# Patient Record
Sex: FEMALE | URBAN - METROPOLITAN AREA
[De-identification: names, ages, dates, MRNs, and addresses within clinical notes are randomized per-mention and may not be internally consistent; named-entity substitution may affect disease eponyms.]

---

## 2018-10-16 ENCOUNTER — MEDICAL CORRESPONDENCE (OUTPATIENT)
Dept: HEALTH INFORMATION MANAGEMENT | Facility: CLINIC | Age: 48
End: 2018-10-16

## 2018-10-16 ENCOUNTER — TRANSFERRED RECORDS (OUTPATIENT)
Dept: HEALTH INFORMATION MANAGEMENT | Facility: CLINIC | Age: 48
End: 2018-10-16

## 2018-11-06 ENCOUNTER — TRANSFERRED RECORDS (OUTPATIENT)
Dept: HEALTH INFORMATION MANAGEMENT | Facility: CLINIC | Age: 48
End: 2018-11-06

## 2018-11-30 NOTE — TELEPHONE ENCOUNTER
FUTURE VISIT INFORMATION      FUTURE VISIT INFORMATION:    Date: 12/10/2018    Time: 2:10    [place] [facility] [place]:    Referring provider:  Dr. Simin Salcido     Referring providers clinic:  [facility]    Reason for visit/diagnosis  throat clearing and hoarseness     RECORDS REQUESTED FROM:       Clinic name Comments Records Status Imaging Status   [facility] OFFICE VISIT: 10/16/2018,11/06/2018 EXTERNAL N/A                                   PUT RECORDS IN HIM BAG TO GET SCANNED.

## 2018-12-10 ENCOUNTER — OFFICE VISIT (OUTPATIENT)
Dept: OTOLARYNGOLOGY | Facility: CLINIC | Age: 48
End: 2018-12-10
Payer: COMMERCIAL

## 2018-12-10 ENCOUNTER — PRE VISIT (OUTPATIENT)
Dept: OTOLARYNGOLOGY | Facility: CLINIC | Age: 48
End: 2018-12-10

## 2018-12-10 DIAGNOSIS — R09.89 CHRONIC THROAT CLEARING: ICD-10-CM

## 2018-12-10 DIAGNOSIS — R49.0 DYSPHONIA: Primary | ICD-10-CM

## 2018-12-10 DIAGNOSIS — J38.7 IRRITABLE LARYNX SYNDROME: ICD-10-CM

## 2018-12-10 DIAGNOSIS — R49.0 DYSPHONIA: ICD-10-CM

## 2018-12-10 RX ORDER — HYDROXYZINE HYDROCHLORIDE 25 MG/1
25 TABLET, FILM COATED ORAL
COMMUNITY
Start: 2018-09-04

## 2018-12-10 RX ORDER — LACTULOSE 10 G/10G
SOLUTION ORAL
COMMUNITY
Start: 2018-09-04

## 2018-12-10 NOTE — LETTER
"12/10/2018      RE: Betty Anglin  [address] [address]       Dear Dr. Salcido:    I had the pleasure of meeting Betty Anglin in consultation at the [facility] of the [facility] at your request, for evaluation of dysphonia and chronic throat-clearing. The note from our visit follows. Speech recognition software may have been used in the documentation below; input is reviewed before signature to the best of my ability. I appreciate the opportunity to participate in the care of this pleasant patient.    Please feel free to contact me with any questions.    Sincerely yours,    Garima Bryson M.D., M.P.H.  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 Symptoms include increased effort to talk/sing, throat irritation, mucus in throat, frequent throat-clearing, poor voice quality, voice tires easily, voice breaks, change in vocal pitch, change in range, shaky/unsteady voice and gravelly voice.      Voice  The patient reports an eight year history of voice problems.  She used to have laryngospasm, but no longer does.  She thinks this is perhaps because she is not pushing herself to the same intensity.  She feels her voice problem began gradually.  Her typical vocal demand is routine. Her singing vocal effort is significantly increased.  She feels that her voice is normal sometimes.  At times, her heavy voice use leads to a \"weak and broken\" voice.  She feels that her singing voice is \"awful\" now.      Swallowing  No concerns.       Cough/Throat-clearing  She states she has had throat-clearing since she had the laryngospasm a number of years ago.  It can be disruptive. She feels often that the " more she talks, the more it happens.       Breathing  No concerns.       Throat discomfort  No concerns. Mild tightness.      Reflux-type symptoms  She experiences heartburn/indigestion monthly. She is not taking reflux medications. She did have a trial of proton pump inhibitors for a period of time.  The throat-clearing was somewhat improved, but persisted.  She was seen at [facility] and was recommended to come to see us for these symptoms.       Prior outside records were reviewed for this visit. She does have a history of binge-eating disorder as well as anxiety. 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 She is also a prior high level runner.    MEDICATIONS:     Current Outpatient Medications   Medication Sig Dispense Refill     linaclotide (LINZESS) 145 MCG capsule Take 145 mcg by mouth       hydrOXYzine (ATARAX) 25 MG tablet Take 25 mg by mouth       lactulose (KRISTALOSE) 10 GM packet Take 1-2 packets 1-3 daily         ALLERGIES:  Allergies not on file    PAST MEDICAL HISTORY: No past medical history on file.  As per HPI    PAST SURGICAL HISTORY: No past surgical history on file.    HABITS/SOCIAL HISTORY:    Social History     Tobacco Use     Smoking status: Not on file   Substance Use Topics     Alcohol use: Not on file   No smoking or alcohol use      FAMILY HISTORY:  Father had lung cancer, then pancreatic cancer    REVIEW OF SYSTEMS:  The patient completed a comprehensive 11 point review of systems (below), which was reviewed. Positives are as noted below; pertinent findings are as noted in the HPI.     Patient Supplied Answers to Review of Systems  No flowsheet data found.    PHYSICAL EXAMINATION:  General: The patient was alert and conversant, and in no acute distress.    Eyes: PERRL, conjunctiva and lids normal, sclera nonicteric.  Nose: Anterior rhinoscopy: no gross abnormalities. no  bleeding; no  mucopurulence; septum grossly  normal, mild mucoid drainage and/or crusting.  Oral cavity/oropharynx: No masses or lesions. Dentition in good condition. Floor of mouth and oral tongue soft to palpation. Tongue mobility and palate elevation intact and symmetric.  Ears: Normal auricles, external auditory canals bilaterally. Visualized portions of tympanic membranes normal bilaterally.   Neck: No palpable cervical lymphadenopathy. There was moderate  tenderness to palpation of the thyrohyoid space, which was narrow. No obvious thyroid abnormality. Landmarks palpable.  Resp: Breathing comfortably, no stridor or stertor.  Neuro: Symmetric facial function. Other cranial nerves as documented above.  Psych: Normal affect, pleasant and cooperative.  Voice/speech: Mild dysphonia characterized by mild intermittent glottal garcia.  Extremities: No cyanosis, clubbing, or edema of the upper extremities.    Intake scores  Total Score for Last Patient-Answered VHI Questionnaire  VHI Total Score 12/10/2018   VHI Total Score 13     Total Score for Last Patient-Answered EAT Questionnaire  EAT Total Score 12/10/2018   EAT Total Score Incomplete     Total Score for Last Patient-Answered CSI Questionnaire  CSI Total Score 12/10/2018   CSI Total Score 0       PROCEDURE: Flexible fiberoptic laryngoscopy and laryngovideostroboscopy  Indications: This procedure was warranted to evaluate the patient's laryngeal anatomy and function. Risks, benefits, and alternatives were discussed.  Description: After written informed consent was obtained, a time-out was performed to confirm patient identity, procedure, and procedure site. Topical 3% lidocaine with 0.25% phenylephrine was applied to the nasal cavities. I performed the endoscopy and no complications were apparent. Continuous and stroboscopic light were utilized to assess routine phonation and variable frequency phonation.  Performed by: Garima Bryson MD MPH  SLP: Neli Kolb, PhD, CCC-SLP   Findings: Normal  nasopharynx. Normal base of tongue, valleculae, and epiglottis. Vocal fold mobility: right: normal; left: normal. Medial edges of the vocal folds: smooth and straight. No focal mucosal lesions were observed on the true vocal folds. Glissade produced appropriate elongation. There was moderate supraglottic recruitment with connected speech. Mucosa of false vocal folds, aryepiglottic folds, piriform sinuses, and posterior glottis unremarkable. Tiny area of prominence right posterior vocal process, no nancy granuloma. Airway was patent.   No lesions on NBI.    The addition of stroboscopy allowed evaluation of the mucosal wave.   Amplitude: right: normal; left: normal. Symmetry: intermittent symmetry. Closure pattern: complete. Closure plane: at glottic level. Phase distribution: normal.            IMPRESSION AND PLAN:   Betty Anglin presents with muscle tension dysphonia and irritable larynx symptoms. There is a tiny area of prominence on the right posterior vocal process, but no nancy granuloma.    I recommended speech therapy as initial primary management, with goals including reducing laryngeal irritability, improving laryngeal efficiency, decreasing vocal fold trauma and improving respiratory/phonatory coordination.     If she feels she has ongoing reflux, she will return to [facility] for further evaluation of reflux, but I recommended that she first pursue speech therapy.     She will return as needed. I appreciate the opportunity to participate in the care of this pleasant patient.             Garima Bryson MD

## 2018-12-10 NOTE — PROGRESS NOTES
"Dear Dr. Salcido:    I had the pleasure of meeting Betty Anglin in consultation at the [facility] of the [facility] at your request, for evaluation of dysphonia and chronic throat-clearing. The note from our visit follows. Speech recognition software may have been used in the documentation below; input is reviewed before signature to the best of my ability. I appreciate the opportunity to participate in the care of this pleasant patient.    Please feel free to contact me with any questions.    Sincerely yours,    Garima Bryson M.D., M.P.H.  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 Symptoms include increased effort to talk/sing, throat irritation, mucus in throat, frequent throat-clearing, poor voice quality, voice tires easily, voice breaks, change in vocal pitch, change in range, shaky/unsteady voice and gravelly voice.      Voice  The patient reports an eight year history of voice problems.  She used to have laryngospasm, but no longer does.  She thinks this is perhaps because she is not pushing herself to the same intensity.  She feels her voice problem began gradually.  Her typical vocal demand is routine. Her singing vocal effort is significantly increased.  She feels that her voice is normal sometimes.  At times, her heavy voice use leads to a \"weak and broken\" voice.  She feels that her singing voice is \"awful\" now.      Swallowing  No concerns.       Cough/Throat-clearing  She states she has had throat-clearing since she had the laryngospasm a number of years ago.  It can be disruptive. She feels often that the more she talks, the more it happens.       Breathing  No concerns.       Throat " discomfort  No concerns. Mild tightness.      Reflux-type symptoms  She experiences heartburn/indigestion monthly. She is not taking reflux medications. She did have a trial of proton pump inhibitors for a period of time.  The throat-clearing was somewhat improved, but persisted.  She was seen at [facility] and was recommended to come to see us for these symptoms.       Prior outside records were reviewed for this visit. She does have a history of binge-eating disorder as well as anxiety. 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 She is also a prior high level runner.    MEDICATIONS:     Current Outpatient Medications   Medication Sig Dispense Refill     linaclotide (LINZESS) 145 MCG capsule Take 145 mcg by mouth       hydrOXYzine (ATARAX) 25 MG tablet Take 25 mg by mouth       lactulose (KRISTALOSE) 10 GM packet Take 1-2 packets 1-3 daily         ALLERGIES:  Allergies not on file    PAST MEDICAL HISTORY: No past medical history on file.  As per HPI    PAST SURGICAL HISTORY: No past surgical history on file.    HABITS/SOCIAL HISTORY:    Social History     Tobacco Use     Smoking status: Not on file   Substance Use Topics     Alcohol use: Not on file   No smoking or alcohol use      FAMILY HISTORY:  Father had lung cancer, then pancreatic cancer    REVIEW OF SYSTEMS:  The patient completed a comprehensive 11 point review of systems (below), which was reviewed. Positives are as noted below; pertinent findings are as noted in the HPI.     Patient Supplied Answers to Review of Systems  No flowsheet data found.    PHYSICAL EXAMINATION:  General: The patient was alert and conversant, and in no acute distress.    Eyes: PERRL, conjunctiva and lids normal, sclera nonicteric.  Nose: Anterior rhinoscopy: no gross abnormalities. no  bleeding; no  mucopurulence; septum grossly normal, mild mucoid drainage and/or crusting.  Oral cavity/oropharynx: No masses  or lesions. Dentition in good condition. Floor of mouth and oral tongue soft to palpation. Tongue mobility and palate elevation intact and symmetric.  Ears: Normal auricles, external auditory canals bilaterally. Visualized portions of tympanic membranes normal bilaterally.   Neck: No palpable cervical lymphadenopathy. There was moderate  tenderness to palpation of the thyrohyoid space, which was narrow. No obvious thyroid abnormality. Landmarks palpable.  Resp: Breathing comfortably, no stridor or stertor.  Neuro: Symmetric facial function. Other cranial nerves as documented above.  Psych: Normal affect, pleasant and cooperative.  Voice/speech: Mild dysphonia characterized by mild intermittent glottal garcia.  Extremities: No cyanosis, clubbing, or edema of the upper extremities.    Intake scores  Total Score for Last Patient-Answered VHI Questionnaire  VHI Total Score 12/10/2018   VHI Total Score 13     Total Score for Last Patient-Answered EAT Questionnaire  EAT Total Score 12/10/2018   EAT Total Score Incomplete     Total Score for Last Patient-Answered CSI Questionnaire  CSI Total Score 12/10/2018   CSI Total Score 0       PROCEDURE: Flexible fiberoptic laryngoscopy and laryngovideostroboscopy  Indications: This procedure was warranted to evaluate the patient's laryngeal anatomy and function. Risks, benefits, and alternatives were discussed.  Description: After written informed consent was obtained, a time-out was performed to confirm patient identity, procedure, and procedure site. Topical 3% lidocaine with 0.25% phenylephrine was applied to the nasal cavities. I performed the endoscopy and no complications were apparent. Continuous and stroboscopic light were utilized to assess routine phonation and variable frequency phonation.  Performed by: Garima Bryson MD MPH  SLP: Neli Kolb, PhD, CCC-SLP   Findings: Normal nasopharynx. Normal base of tongue, valleculae, and epiglottis. Vocal fold mobility:  right: normal; left: normal. Medial edges of the vocal folds: smooth and straight. No focal mucosal lesions were observed on the true vocal folds. Glissade produced appropriate elongation. There was moderate supraglottic recruitment with connected speech. Mucosa of false vocal folds, aryepiglottic folds, piriform sinuses, and posterior glottis unremarkable. Tiny area of prominence right posterior vocal process, no nancy granuloma. Airway was patent.   No lesions on NBI.    The addition of stroboscopy allowed evaluation of the mucosal wave.   Amplitude: right: normal; left: normal. Symmetry: intermittent symmetry. Closure pattern: complete. Closure plane: at glottic level. Phase distribution: normal.            IMPRESSION AND PLAN:   Betty Anglin presents with muscle tension dysphonia and irritable larynx symptoms. There is a tiny area of prominence on the right posterior vocal process, but no nancy granuloma.    I recommended speech therapy as initial primary management, with goals including reducing laryngeal irritability, improving laryngeal efficiency, decreasing vocal fold trauma and improving respiratory/phonatory coordination.     If she feels she has ongoing reflux, she will return to [facility] for further evaluation of reflux, but I recommended that she first pursue speech therapy.     She will return as needed. I appreciate the opportunity to participate in the care of this pleasant patient.

## 2018-12-10 NOTE — PATIENT INSTRUCTIONS
1.  You were seen in the ENT Clinic today by Dr. Bryson.  If you have any questions or concerns after your appointment, please call [phone].  Press option #1 for scheduling related needs.  Press option #3 for Nurse advice.  2.  Plan is to return to clinic as needed.    Destiny Hayden RN  [facility]   Head & Neck Surgery

## 2018-12-10 NOTE — PROGRESS NOTES
[facility] VOICE CLINIC  Jose M Berger Jr., M.D., F.A.C.S.  Garima Bryson M.D., M.P.H.  Neli Kolb, Ph.D., CCC/SLP  Anita Francois M.M. (voice), MBRANDON., CCC/SLP  Milo Chen M.M. (voice), M.A., CCC/SLP    [facility] VOICE [facility]  VOICE/SPEECH/BREATHING EVALUATION AND LARYNGEAL EXAMINATION REPORT    Patient: Betty Anglin  Date of Visit: 12/10/2018    Clinician: Neli Kolb, Ph.D., CCC/SLP  Seen in conjunction with: Dr. Bryson  Referring Physician: Dr. aSlcido from [facility]    CHIEF COMPLAINT:  Dysphonia, throat-clearing    HISTORY  Betty Anglin is a 48 year old presenting today for evaluation of dysphonia and throat-clearing.    Please refer to Dr. Bryson s dictation for a more complete history and impressions.   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, but heavier use in yoga and other activities, as well as social singing    Throat-clearing is variable but constant; very annoying on bad days    Not consistent with eating or reflux    Always present in the morning    GI work-up showed that reflux does not account for throat symptoms; better managed by ENT    DIAGNOSIS/REASON FOR REFERRAL  Dysphonia/ Evaluate, perform laryngeal exam, treat as appropriate    Patient Supplied Answers To [facility] Intake General Questionnaire  [facility] Intake - General Form Review 12/10/2018   Are you having any of these symptoms? Increased effort to talk/sing, Throat irritation, Mucus in throat, Frequent throat-clearing, Poor voice quality   Are you  having any of these symptoms? Voice tires easily, Voice breaks, Change in vocal pitch, Change in range, Shaky/unsteady voice, Gravelly voice   Do you use caffeine? Yes   If you do use caffeine, how many oz. do you typically drink in a day? 4   How many oz. of non-caffeinated fluid do you typically drink in a day? 60   How often do you experience heartburn, indigestion, chest pain, stomach acid coming up, and/or tasting acid in your mouth or throat? Monthly   Have reflux medications been recommended to you? Yes   If yes, are you taking them regularly? No   Voice: Yes   Swallowing: No   Cough and/or throat-clearing: Yes   Breathing problem: No   Throat discomfort and/or the feeling of a lump in your throat: No   A different problem, not listed above: No   Other physicians/health care providers who should receive a copy of today's note (please provide first and last name(s), city): Dr Omari orellana       Patient Supplied Answers To [facility] Intake Voice Questionnaire  [facility] Intake - Voice Review 12/10/2018   How long have you had this voice problem? 8 years   In regards to your voice problem, was there anything unusual about the time the problem was first noticed (such as illness, accident, surgery, etc.)?  If yes, please describe.  You have 200 characters to respond.  We will ask for more detail at your visit. I was having issues with breathing in with extreme exercise occassionally but dont have this any longer.  i assumed it was laryngospasm.  my throat clearing started then too   What do you think caused this voice problem? Laryngopharyngeal reflux   How quickly did the voice problem develop? Gradually   For your voice problem, how do the symptoms vary? Most of the time   Over time, how has the voice problem changed? Same   How would you rate your typical vocal demand? 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 Yes   What health care professionals have you seen for this voice problem?  Who and when? no one   For your voice problem, what testing/studies have you done (such as imaging/reflux testing)? i had an EGD for LPR in 2011 and it was normal.  it was not done for voice specifically   Did you receive any therapy or treatment for your voice problem?  Please describe briefly. no   Prior to this episode, have you ever had a voice problem before?  If yes, at that time did you have therapy or treatment for the voice problem?  Please provide a brief description of that treatment. no   For your voice problem, is there anything else you'd like to tell us? There are rare times where I have a weak and broken voice when i have talked alot.  Mainly I have lots of throat clearing, worse in morning or after certain meals especially if i sit or ride  bike afterwards.  My singing voice is awful now   There isn't much I can do to help myself feel better about this problem. Agree somewhat   How I deal with the voice problem now is under my control. Agree somewhat   I don't have much control over my emotional reactions to this problem. Disagree somewhat   When I am upset about the voice problem, I can find a way to feel better. Agree somewhat   I have control over my day-to-day reactions to the voice problem. Strongly Agree   There isn't much I can do to keep the voice problem from affecting me. Disagree somewhat   I have control over how I think about the voice problem. Strongly Agree   My reaction to the voice problem is not under my control. Disagree somewhat   VPCMEAN 2.12       Patient Supplied Answers To VHI Questionnaire  Voice Handicap Index (VHI-10) 12/10/2018   My voice makes it difficult for people to hear me  "1   People have difficulty understanding me in a noisy room 1   My voice difficulties restrict my personal and social life.  0   I feel left out of conversations because of my voice 1   My voice problem causes me to lose income 0   I feel as though I have to strain to produce voice 2   The clarity of my voice is unpredictable 3   My voice problem upsets me 2   My voice makes me feel handicapped 2   People ask, \"What's wrong with your voice?\" 1   VHI-10 13       Patient Supplied Answers To [facility] Intake Cough/Throat-Clearing Questionnaire  No flowsheet data found.    CURRENT PATIENT COMPLAINTS    Primary: dysphonia    Secondary: throat-clearing    Some history of laryngospasm, but no longer problematic     Denies significant dyspnea, dysphagia, or pain    OTHER PERTINENT HISTORY    IBS and GI problems    Please also see Dr. Bryson's dictation    OBJECTIVE FINDINGS  VOICE/ SPEECH/ NON-COMMUNICATIVE LARYNGEAL BEHAVIORS EVALUATION  An evaluation of the voice and throat-clear was accomplished today; salient features are as follows:    Palpation of the laryngeal area shows:    Reduced thyrohyoid space     Throat clear:    Very frequent    Dry    Locus of throat clear: sounds consistent with upper airway    Mild in severity    Breathing pattern:    Appears within normal limits and adequate     Inspirations are often inadequate for singing in volume    Thoracic muscle use pattern; tight abdominal musculature, with minimal movement of abdominal area    No overt tension is evident.    Voice quality is characterized by    WNL in speech    No remarkable roughness, breathiness, or strain    Habitual pitch is WNL in the range from F#3 to A#3    Loudness is WNL and appropriate for the setting    In a task comparing voiced and voiceless phonemes, there is no difference    Sustained vowels:     Comfortable pitch /a/: A3 - mildly unstable, with a fine irregularity/roughness    Comfortable pitch /i/: Bb3 - more stable, less rough than " "the /a/    A singing task shows voice quality is worse than in speech    [place] adjustment problems    Weakness in upper register; very weak at Bb4    Unstable quality; pitch and register breaks    In a pitch glide task to determine pitch range, she demonstrates good range, but tight at the very top    Highest pitch:  Uncertain; she squeaks then breaks at the top    Lowest pitch: Uncertain due to garcia    Upper pitches are weak and tight    she states today is typical for a good voice day, but her voice can be much worse    GLOBAL ASSESSMENT OF DYSPHONIA: 35 /100    CAPE-V Overall Severity:   4/100 for speech, 28/100 for singing    AERODYNAMIC AND ACOUSTIC EVALUATION (CPT 42622 - Laryngeal Function Studies)  Laryngeal Function Studies (CPT 79556)     Acoustic Measures     Protocol / Parameter = result     Fundamental frequency Metrics        /a/ mean F0 = 226 Hz (SD = 2.41 Hz)        /i/ mean F0 = 232 Hz (SD = 2.83 Hz)        [place] Mean f0 = 184 Hz (SD 25.71 Hz)       Glide:            Min F0 = 59 Hz           Max F0 = 1671 Hz           Range = 1612 Hz           Notes = both these pitches appear to be artifact, but pitch range is perceptually very good     Cepstral Measures        CPPS /a/ = 26.68 dB        CPPS /i/ = 27.02 dB        CPPS \"all voiced\" = 23.14 dB        AVQI (v.3.01) = 1.57     Additional Measures        Harmonic to Noise Ratio /a/ = 26.90 dB        Harmonic to Noise Ratio: [place] = 16.49 dB       Jitter (local) /a/ = 0.317 %        Shimmer (local) /a/ = 1.671 %     Aerodynamic Measures     Protocol / Parameter Result Normative Mean (SD)   Vital Capacity     Expiratory Volume    ()    Comfortable Sustained Phonation     Mean SPL    ()    Mean Pitch    ()    Peak Expiratory Airflow    ()    Mean Expiratory Airflow  approx. 0.2 - 0.25  ()    Voicing Efficiency     Peak Air Pressure 1.34 Sec 1.55 (0.54) Sec   Mean Peak Air Pressure 0 Sec  () Sec   Peak Expiratory Airflow 4.28 cm H2O " 5.76 (1.51) cm H2O   Mean Airflow During Voicing 0.221 Lit/Sec 0.14 (0.06) Lit/Sec   Running Speech: All Voiced Stimulus     Mean SPL 72.63 dB    Mean Pitch 37.95 dB    Peak Expiratory Airflow 2.33 Sec    Mean Expiratory Airflow 0.77 Sec    Mean Airflow During Voicing 0.23 Lit/Sec    Running Speech: [place] Passage     Mean SPL 72.55 dB    SPL Range 35.17 dB    Mean Pitch 37.38 dB    Pitch Range 65.86 dB    Peak Expiratory Airflow 18.97 Sec    Mean Expiratory Airflow 3.11 Sec    Expiratory Volume 0.2 Lit/Sec    Mean Airflow During Voicing 0.2 Lit/Sec    Peak Inspiratory Airflow 3.79 Liters    Inspiratory Volume -1.25 Lit/Sec        Airflow measures for Comfortable Sustained Phonation did not copy, but were also in the normal range.  Vital lung capacity with WNL, but not stellar for an athlete.      LARYNGEAL EXAMINATION    Endoscopic laryngeal examination was performed by:  Garima Bryson MD,   I provided technical support, and provided the protocol of instructions for the patient.    Type of exam:   Flexible endoscopy with chip-tip technology and stroboscopy  This exam shows:    Laryngeal and Vocal Fold Mucosa    Essentially healthy laryngeal mucosa    No remarkable signs of reflux    Mild to moderate presence of thickened secretions on the vocal folds and throughout the laryngeal area; some stranding, and some presence of bubbly secretions in pyriform sinuses    Status of vocal fold mucosa:   o Very mild irritation of the posterior mucosa of the right vocal fold, not yet consistent with granuloma, consistent with frequent throat clear  o Otherwise within normal limits, with no lesions and straight vibratory margins    Neurological and Functional Integrity of the Larynx    Vocal folds are mobile and meet at midline; movement is brisk and symmetric; exam is neurologically normal   o The left vocal fold sometimes appears mildly sluggish, but this is not consistent, and judged to be muscular rather than  neurological    Mild incoordination is evident during a task of 20 quickly repeated vowels    Elongation of the vocal folds for pitch increase is WNL, but accompanied by some muscle tension    On phonation, glottic closure is mildly weak at the midfold at higher pitches    Supraglottic Function and Therapy Probes    moderate four-way constriction of the supraglottic larynx during connected speech    Supraglottic function during singing is improved, but glottic closure is weaker    Therapy probes show   o Reduction in supraglottic hyperfunction during tasks that involved mildly projected speech    Stroboscopy provided the following additional information:    The mucosa is mildly stiff at the anterior right, and mucosal wave is slightly interrupted there    The mucosal wave is otherwise within normal limits in periodicity, amplitude, symmetry, and phase    Dr. Bryson and I reviewed this laryngeal exam with Dr. Anglin today, and I provided pertinent explanations, as well as written information:    Endoscopic findings are consistent with audio-perceptual assessment and patient Hx/complaints.    her symptoms are accounted for by muscular imbalance and some presence of secretions and mucosal irritation.     Because there appears to be a functional component to her symptoms, she would most likely benefit from functional speech therapy.    ASSESSMENT / PLAN  IMPRESSIONS:  This evaluation has resulted in the following diagnosis/diagnoses for Dr. Anglin  R49.0 (Dysphonia)  R68.89 (Chronic Throat Clearing)  J38.7 (Irritable Larynx Syndrome).      Laryngeal evaluation demonstrated mucosal and muscular findings    Perceptual evaluation and laryngeal function studies demonstrated normal acoustic and aerodynamic measures, but obvious worse phonatory quality in upper register, higher pitches, and singing tasks  Dysphonia/discomfort is accounted for by the imbalanced function of the intrinsic and extrinsic laryngeal musculature     Cough/throat clear are accounted for by the hypersensitivity of the larynx and pharynx as evidenced by case history, patient complaints and absence of other organic findings; hypersensitivity is compounded by imbalance in the function of the intrinsic and extrinsic laryngeal musculature during connected speech  Because there appears to be a functional component to her symptoms, she would most likely benefit from a course of functional speech therapy  STIMULABILITY: results of therapy probes during perceptual and laryngeal evaluation demonstrate improvement with coordination of respiration and phonation  RECOMMENDATIONS:     A course of speech therapy is recommended to optimize vocal technique, improve voice quality, promote reduced discomfort, effort and fatigue and help reduce throat clear and mucosal irritation.    She demonstrates a Good prognosis for improvement given adherence to therapeutic recommendations. Therapeutic     Positive indicators: commitment to process; diagnoses are known to respond to functional speech therapy    Negative indicators: none    TREATMENT PLAN  Speech therapy    DURATION/FREQUENCY OF TREATMENT  Eight weekly or bi-weekly one-hour sessions, with two monthly one-hour follow-up sessions    GOALS  Patient goal:    To have a normal and acceptable voice quality and comfort for all her voice needs  To reduce her throat-clearing to acceptable levels    Long-term goal(s): In 8 months, Dr. Anglin will:  1.  Report a week of typical vocal activities, in which dysphonia and throat irritation do not exceed a level of 2 out of 10, 90% of the time   2.  Report a week with no more than two episodes of throat-clearing per day, that do not last more than two seconds  3.  In a singing task, demonstrate ability to vocalize to Bb5, five times in 5 minutes, with breathiness, weakness, and register adjustment problems that does not exceed a level of 3 out of 10, throughout the pitch range, for all 5  repetitions, by therapist judgment    This treatment plan was developed with the patient who agreed with the recommendations.      TOTAL SERVICE TIME: 75 minutes  EVALUATION OF VOICE AND RESONANCE (99634)  LARYNGEAL FUNCTION STUDIES (99969)  NO CHARGE FACILITY FEE (94880)      Neli Kolb, Ph.D., [facility]-SLP  Speech-Language Pathologist  Director, [facility]  [phone]

## 2018-12-10 NOTE — LETTER
12/10/2018       RE: Betty Anglin  [address] [address]     Dear Colleague,    Thank you for referring your patient, Betty Anglin, to the [facility]. Please see a copy of my visit note below.        [facility] VOICE CLINIC  Jose M Berger Jr., M.D., F.A.C.S.  Garima Bryson M.D., M.P.H.  Neli Kolb, Ph.D., CCC/SLP  Anita Francois M.M. (voice), M.A., CCC/SLP  Milo Chen M.M. (voice), M.A., CCC/SLP    [facility]  VOICE/SPEECH/BREATHING EVALUATION AND LARYNGEAL EXAMINATION REPORT    Patient: Betty Anglin  Date of Visit: 12/10/2018    Clinician: Neli Kolb, Ph.D., CCC/SLP  Seen in conjunction with: Dr. Bryson  Referring Physician: Dr. Salcido from [facility]    CHIEF COMPLAINT:  Dysphonia, throat-clearing    HISTORY  Betty Anglin is a 48 year old presenting today for evaluation of dysphonia and throat-clearing.    Please refer to Dr. Bryson s dictation for a more complete history and impressions.   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, but heavier use in yoga and other activities, as well as social singing    Throat-clearing is variable but constant; very annoying on bad days    Not consistent with eating or reflux    Always present in the morning    GI work-up showed that reflux does not account for throat symptoms; better managed by ENT    DIAGNOSIS/REASON FOR REFERRAL  Dysphonia/ Evaluate, perform laryngeal exam, treat as  appropriate    Patient Supplied Answers To [facility] General Questionnaire  [facility] Intake - General Form Review 12/10/2018   Are you having any of these symptoms? Increased effort to talk/sing, Throat irritation, Mucus in throat, Frequent throat-clearing, Poor voice quality   Are you having any of these symptoms? Voice tires easily, Voice breaks, Change in vocal pitch, Change in range, Shaky/unsteady voice, [place] voice   Do you use caffeine? Yes   If you do use caffeine, how many oz. do you typically drink in a day? 4   How many oz. of non-caffeinated fluid do you typically drink in a day? 60   How often do you experience heartburn, indigestion, chest pain, stomach acid coming up, and/or tasting acid in your mouth or throat? Monthly   Have reflux medications been recommended to you? Yes   If yes, are you taking them regularly? No   Voice: Yes   Swallowing: No   Cough and/or throat-clearing: Yes   Breathing problem: No   Throat discomfort and/or the feeling of a lump in your throat: No   A different problem, not listed above: No   Other physicians/health care providers who should receive a copy of today's note (please provide first and last name(s), city): Dr Salcido [place]       Patient Supplied Answers To [facility] Intake Voice Questionnaire  [facility] Intake - Voice Review 12/10/2018   How long have you had this voice problem? 8 years   In regards to your voice problem, was there anything unusual about the time the problem was first noticed (such as illness, accident, surgery, etc.)?  If yes, please describe.  You have 200 characters to respond.  We will ask for more detail at your visit. I was having issues with breathing in with extreme exercise occassionally but dont have this any longer.  i assumed it was laryngospasm.  my throat clearing started then too   What do you think caused this voice problem? Laryngopharyngeal reflux   How quickly did the voice problem develop? Gradually   For your voice problem,  how do the symptoms vary? Most of the time   Over time, how has the voice problem changed? Same   How would you rate your typical vocal demand? 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 Yes   What health care professionals have you seen for this voice problem?  Who and when? no one   For your voice problem, what testing/studies have you done (such as imaging/reflux testing)? i had an EGD for LPR in 2011 and it was normal.  it was not done for voice specifically   Did you receive any therapy or treatment for your voice problem?  Please describe briefly. no   Prior to this episode, have you ever had a voice problem before?  If yes, at that time did you have therapy or treatment for the voice problem?  Please provide a brief description of that treatment. no   For your voice problem, is there anything else you'd like to tell us? There are rare times where I have a weak and broken voice when i have talked alot.  Mainly I have lots of throat clearing, worse in morning or after certain meals especially if i sit or ride  bike afterwards.  My singing voice is awful now   There isn't much I can do to help myself feel better about this problem. Agree somewhat   How I deal with the voice problem now is under my control. Agree somewhat   I don't have much control over my emotional reactions to this problem. Disagree somewhat   When I am upset about the voice problem, I can find a way to feel better. Agree somewhat   I have control over my day-to-day reactions to the voice problem. Strongly Agree   There isn't much I can do to keep the voice problem from affecting me. Disagree somewhat   I have control over how  "I think about the voice problem. Strongly Agree   My reaction to the voice problem is not under my control. Disagree somewhat   VPCMEAN 2.12       Patient Supplied Answers To VHI Questionnaire  Voice Handicap Index (VHI-10) 12/10/2018   My voice makes it difficult for people to hear me 1   People have difficulty understanding me in a noisy room 1   My voice difficulties restrict my personal and social life.  0   I feel left out of conversations because of my voice 1   My voice problem causes me to lose income 0   I feel as though I have to strain to produce voice 2   The clarity of my voice is unpredictable 3   My voice problem upsets me 2   My voice makes me feel handicapped 2   People ask, \"What's wrong with your voice?\" 1   VHI-10 13       Patient Supplied Answers To [facility] Intake Cough/Throat-Clearing Questionnaire  No flowsheet data found.    CURRENT PATIENT COMPLAINTS    Primary: dysphonia    Secondary: throat-clearing    Some history of laryngospasm, but no longer problematic     Denies significant dyspnea, dysphagia, or pain    OTHER PERTINENT HISTORY    IBS and GI problems    Please also see Dr. Bryson's dictation    OBJECTIVE FINDINGS  VOICE/ SPEECH/ NON-COMMUNICATIVE LARYNGEAL BEHAVIORS EVALUATION  An evaluation of the voice and throat-clear was accomplished today; salient features are as follows:    Palpation of the laryngeal area shows:    Reduced thyrohyoid space     Throat clear:    Very frequent    Dry    Locus of throat clear: sounds consistent with upper airway    Mild in severity    Breathing pattern:    Appears within normal limits and adequate     Inspirations are often inadequate for singing in volume    Thoracic muscle use pattern; tight abdominal musculature, with minimal movement of abdominal area    No overt tension is evident.    Voice quality is characterized by    WNL in speech    No remarkable roughness, breathiness, or strain    Habitual pitch is WNL in the range from F#3 to " "A#3    Loudness is WNL and appropriate for the setting    In a task comparing voiced and voiceless phonemes, there is no difference    Sustained vowels:     Comfortable pitch /a/: A3 - mildly unstable, with a fine irregularity/roughness    Comfortable pitch /i/: Bb3 - more stable, less rough than the /a/    A singing task shows voice quality is worse than in speech    [place] adjustment problems    Weakness in upper register; very weak at Bb4    Unstable quality; pitch and register breaks    In a pitch glide task to determine pitch range, she demonstrates good range, but tight at the very top    Highest pitch:  Uncertain; she squeaks then breaks at the top    Lowest pitch: Uncertain due to garcia    Upper pitches are weak and tight    she states today is typical for a good voice day, but her voice can be much worse    GLOBAL ASSESSMENT OF DYSPHONIA: 35 /100    CAPE-V Overall Severity:   4/100 for speech, 28/100 for singing    AERODYNAMIC AND ACOUSTIC EVALUATION (CPT 33248 - Laryngeal Function Studies)  Laryngeal Function Studies (CPT 04764)     Acoustic Measures     Protocol / Parameter = result     Fundamental frequency Metrics        /a/ mean F0 = 226 Hz (SD = 2.41 Hz)        /i/ mean F0 = 232 Hz (SD = 2.83 Hz)        [place] Passage Mean f0 = 184 Hz (SD 25.71 Hz)       Glide:            Min F0 = 59 Hz           Max F0 = 1671 Hz           Range = 1612 Hz           Notes = both these pitches appear to be artifact, but pitch range is perceptually very good     Cepstral Measures        CPPS /a/ = 26.68 dB        CPPS /i/ = 27.02 dB        CPPS \"all voiced\" = 23.14 dB        AVQI (v.3.01) = 1.57     Additional Measures        Harmonic to Noise Ratio /a/ = 26.90 dB        Harmonic to Noise Ratio: [place] passage = 16.49 dB       Jitter (local) /a/ = 0.317 %        Shimmer (local) /a/ = 1.671 %     Aerodynamic Measures     Protocol / Parameter Result Normative Mean (SD)   Vital Capacity     Expiratory Volume    ()  "   Comfortable Sustained Phonation     Mean SPL    ()    Mean Pitch    ()    Peak Expiratory Airflow    ()    Mean Expiratory Airflow  approx. 0.2 - 0.25  ()    Voicing Efficiency     Peak Air Pressure 1.34 Sec 1.55 (0.54) Sec   Mean Peak Air Pressure 0 Sec  () Sec   Peak Expiratory Airflow 4.28 cm H2O 5.76 (1.51) cm H2O   Mean Airflow During Voicing 0.221 Lit/Sec 0.14 (0.06) Lit/Sec   Running Speech: All Voiced Stimulus     Mean SPL 72.63 dB    Mean Pitch 37.95 dB    Peak Expiratory Airflow 2.33 Sec    Mean Expiratory Airflow 0.77 Sec    Mean Airflow During Voicing 0.23 Lit/Sec    Running Speech: [place] Passage     Mean SPL 72.55 dB    SPL Range 35.17 dB    Mean Pitch 37.38 dB    Pitch Range 65.86 dB    Peak Expiratory Airflow 18.97 Sec    Mean Expiratory Airflow 3.11 Sec    Expiratory Volume 0.2 Lit/Sec    Mean Airflow During Voicing 0.2 Lit/Sec    Peak Inspiratory Airflow 3.79 Liters    Inspiratory Volume -1.25 Lit/Sec        Airflow measures for Comfortable Sustained Phonation did not copy, but were also in the normal range.  Vital lung capacity with WNL, but not stellar for an athlete.      LARYNGEAL EXAMINATION    Endoscopic laryngeal examination was performed by:  Garima Bryson MD,   I provided technical support, and provided the protocol of instructions for the patient.    Type of exam:   Flexible endoscopy with chip-tip technology and stroboscopy  This exam shows:    Laryngeal and Vocal Fold Mucosa    Essentially healthy laryngeal mucosa    No remarkable signs of reflux    Mild to moderate presence of thickened secretions on the vocal folds and throughout the laryngeal area; some stranding, and some presence of bubbly secretions in pyriform sinuses    Status of vocal fold mucosa:   o Very mild irritation of the posterior mucosa of the right vocal fold, not yet consistent with granuloma, consistent with frequent throat clear  o Otherwise within normal limits, with no lesions and straight vibratory  margins    Neurological and Functional Integrity of the Larynx    Vocal folds are mobile and meet at midline; movement is brisk and symmetric; exam is neurologically normal   o The left vocal fold sometimes appears mildly sluggish, but this is not consistent, and judged to be muscular rather than neurological    Mild incoordination is evident during a task of 20 quickly repeated vowels    Elongation of the vocal folds for pitch increase is WNL, but accompanied by some muscle tension    On phonation, glottic closure is mildly weak at the midfold at higher pitches    Supraglottic Function and Therapy Probes    moderate four-way constriction of the supraglottic larynx during connected speech    Supraglottic function during singing is improved, but glottic closure is weaker    Therapy probes show   o Reduction in supraglottic hyperfunction during tasks that involved mildly projected speech    Stroboscopy provided the following additional information:    The mucosa is mildly stiff at the anterior right, and mucosal wave is slightly interrupted there    The mucosal wave is otherwise within normal limits in periodicity, amplitude, symmetry, and phase    Dr. Bryson and I reviewed this laryngeal exam with Dr. Anglin today, and I provided pertinent explanations, as well as written information:    Endoscopic findings are consistent with audio-perceptual assessment and patient Hx/complaints.    her symptoms are accounted for by muscular imbalance and some presence of secretions and mucosal irritation.     Because there appears to be a functional component to her symptoms, she would most likely benefit from functional speech therapy.    ASSESSMENT / PLAN  IMPRESSIONS:  This evaluation has resulted in the following diagnosis/diagnoses for Dr. Anglin  R49.0 (Dysphonia)  R68.89 (Chronic Throat Clearing)  J38.7 (Irritable Larynx Syndrome).      Laryngeal evaluation demonstrated mucosal and muscular findings    Perceptual evaluation and  laryngeal function studies demonstrated normal acoustic and aerodynamic measures, but obvious worse phonatory quality in upper register, higher pitches, and singing tasks  Dysphonia/discomfort is accounted for by the imbalanced function of the intrinsic and extrinsic laryngeal musculature    Cough/throat clear are accounted for by the hypersensitivity of the larynx and pharynx as evidenced by case history, patient complaints and absence of other organic findings; hypersensitivity is compounded by imbalance in the function of the intrinsic and extrinsic laryngeal musculature during connected speech  Because there appears to be a functional component to her symptoms, she would most likely benefit from a course of functional speech therapy  STIMULABILITY: results of therapy probes during perceptual and laryngeal evaluation demonstrate improvement with coordination of respiration and phonation  RECOMMENDATIONS:     A course of speech therapy is recommended to optimize vocal technique, improve voice quality, promote reduced discomfort, effort and fatigue and help reduce throat clear and mucosal irritation.    She demonstrates a Good prognosis for improvement given adherence to therapeutic recommendations. Therapeutic     Positive indicators: commitment to process; diagnoses are known to respond to functional speech therapy    Negative indicators: none    TREATMENT PLAN  Speech therapy    DURATION/FREQUENCY OF TREATMENT  Eight weekly or bi-weekly one-hour sessions, with two monthly one-hour follow-up sessions    GOALS  Patient goal:    To have a normal and acceptable voice quality and comfort for all her voice needs  To reduce her throat-clearing to acceptable levels    Long-term goal(s): In 8 months, Dr. Anglin will:  1.  Report a week of typical vocal activities, in which dysphonia and throat irritation do not exceed a level of 2 out of 10, 90% of the time   2.  Report a week with no more than two episodes of  throat-clearing per day, that do not last more than two seconds  3.  In a singing task, demonstrate ability to vocalize to Bb5, five times in 5 minutes, with breathiness, weakness, and register adjustment problems that does not exceed a level of 3 out of 10, throughout the pitch range, for all 5 repetitions, by therapist judgment    This treatment plan was developed with the patient who agreed with the recommendations.      TOTAL SERVICE TIME: 75 minutes  EVALUATION OF VOICE AND RESONANCE (70659)  LARYNGEAL FUNCTION STUDIES (58266)  NO CHARGE FACILITY FEE (73342)      Neli Kolb, Ph.D., [facility]-SLP  Speech-Language Pathologist  Director, [facility]  [phone]

## 2018-12-12 PROBLEM — R09.89 CHRONIC THROAT CLEARING: Status: ACTIVE | Noted: 2018-12-12

## 2018-12-12 PROBLEM — R49.0 DYSPHONIA: Status: ACTIVE | Noted: 2018-12-12

## 2018-12-12 PROBLEM — J38.7 IRRITABLE LARYNX SYNDROME: Status: ACTIVE | Noted: 2018-12-12

## 2019-01-09 ENCOUNTER — TELEPHONE (OUTPATIENT)
Dept: OTOLARYNGOLOGY | Facility: CLINIC | Age: 49
End: 2019-01-09

## 2019-01-09 NOTE — TELEPHONE ENCOUNTER
Left message returning patients call to schedule follow up appointments with [facility]ts in the ENT Voice clinic.

## 2019-01-14 NOTE — TELEPHONE ENCOUNTER
Health Call Center    Phone Message    May a detailed message be left on voicemail: yes    Reason for Call: Other: PT:  Pt is trying to [facility] appts (Speech therapy appts), Pt was hoping to get in sooner for an appt. Please call pt to further assist, pt state she is very frustrated and would like a call so she can coordinate these appts all together thanks. DO - Pt was told from Neli to [facility] appts with Sandhya not scheduling line     Action Taken: Message routed to:  [facility] ([facility]): ENT/SLP

## 2019-01-31 ENCOUNTER — OFFICE VISIT (OUTPATIENT)
Dept: OTOLARYNGOLOGY | Facility: CLINIC | Age: 49
End: 2019-01-31
Payer: COMMERCIAL

## 2019-01-31 DIAGNOSIS — R49.0 DYSPHONIA: Primary | ICD-10-CM

## 2019-01-31 DIAGNOSIS — R09.89 CHRONIC THROAT CLEARING: ICD-10-CM

## 2019-01-31 DIAGNOSIS — J38.7 IRRITABLE LARYNX SYNDROME: ICD-10-CM

## 2019-01-31 NOTE — PROGRESS NOTES
[facility] VOICE [facility]  Jose M Berger Jr., M.D., F.A.C.S.  Garima Bryson M.D., M.P.H.  Neli Kolb, Ph.D., CCC/SLP  Anita Francois M.M. (voice), M.A., CCC/SLP  Milo Chen M.M. (voice), M.A., [facility]/SLP    [facility] VOICE [facility]  VOICE/SPEECH/BREATHING THERAPY PROGRESS REPORT    Patient: Betty Anglin  Date of Service: 1/31/2019    Date of Last Service: 12/10/18  Session number: 1  Referring physician: Dr. Bryson  Initial evaluation: 12/10/18    I had the pleasure of seeing Dr. Anglin today, for speech therapy to address a diagnosis of:  R49.0 (Dysphonia)   R68.89 (Chronic Throat Clearing)   J38.7 (Irritable Larynx Syndrome).    PROGRESS SINCE LAST SESSION  Dr. Anglin was seen for evaluation on 12/10/18.  At that time, it was determined that  she would benefit from a course of speech therapy to address the above diagnosis.  Some therapeutic suggestions were made at the time, to decrease her throat clearing.    Dr. 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 Anglin presents today with the following:  Voice quality:    Mildly pressed with frequent mild garcia or strained quality    Bursts of better airflow and quality    Variable pitch range, but mostly in the range from F3 to C4    THERAPEUTIC ACTIVITIES  Today Dr. Anglin participated in the following therapeutic activities:    [place] concepts of applying ice, heat, stretch,  "massage, and splinting to the tender areas of her neck, in order to reduce pain.  o This produced immediate improvement, which pleased her    [place] exercises for optimal respiratory mechanics for speech and singing.  o I reviewed salient aspects of the anatomy and physiology of respiration for speech and singing; she found this to be helpful  o she demonstrated clavicular/neck/shoulder involvement in inhalation  o demonstrated difficulty allowing abdominal relaxation for inhalation  o with guidance, learned improved abdominal relaxation for inhalation  o Worked on techniques to feel complete inhalation, with oral configuration to reduce paradoxical vocal fold motion  o learned techniques for optimal airflow on exhalation  o good learning, but will need practice    [place] exercises to add phonation to the optimal flowing airstream.  o Semi-occluded vocal tract exercises with a straw (\"cup and bubbles\") were most facilitating  o I provided thorough explanation of the physiological benefit of these exercises  o She experienced good improvement in both quality and sensation while phonating into the straw  o Instructed to use as a voice warm up, cool down, coordination of breath flow with phonation.  o good learning, but will need practice     [place] concepts of an optimal regimen for practice.  o she should use an interval schedule of practice, with brief periods of practice frequently throughout each day    I provided an after visit summary to help facilitate practice.    IMPRESSIONS/GOALS/PLAN  Dr. Anglin had a productive session of speech therapy today, to address the following:  R49.0 (Dysphonia)   R68.89 (Chronic Throat Clearing)   J38.7 (Irritable Larynx Syndrome)  Speech therapy for her is medically necessary to allow  her to meet personal and professional demands and fully engage in activities of daily living.     She will work on her exercises on a daily basis, and work on incorporating the techniques into " her daily activities.    Goals for this practice period:     practice all exercises according to instructions    Plan: I will see Dr. Anglin in two weeks to work on education, modification, and carryover of therapeutic activities to more complex activities.    TOTAL SERVICE TIME: 60 minutes  TREATMENT (53465  NO CHARGE FACILITY FEE (77600)    Neli Kolb, Ph.D., [facility]-SLP  Speech-Language Pathologist  Director, [facility]  [phone]

## 2019-01-31 NOTE — PATIENT INSTRUCTIONS
After Visit Summary    Patient: Betty Anglin  Date of Visit: 1/31/2019    Relieving Extrinsic Muscle Discomfort:    Ice and Heat  o Please follow instructions as learned today    Stretches  o Tip head back, jut chin to ceiling, hold for 15 seconds    Massage  o Rub downward along the length of the thyrohyoid muscle  o Thumb and index finger, or knuckles (leave them in the space and just let them be gravity)  o Thumb and index finger in thyrohyoid space, and see-saw; gentle, for short periods, many times per day (hyoid release)  o Increase the thyrohyoid space      Splinting  o Gentle dowonward pressure on thyrohyoid space while talking        Breathing:    Breathing Tips:  o Belly breathing is fast!  And precedes the inhale     Voice:    Semi-occluded vocal tract exercise:   o Bubbles (straw in 1.5 to 2  of water) 5x/day for 1-2 min:  o 2x: blow 10-15 seconds with no voice and keep bubbles consistent.  o 3x: blow bubbles and add a sustained  whooo    - Sense your voice in the straw, or in your mouth/face, not in your throat  o 3x: blow bubbles and vary  who  gliding up and down             Up and down like a siren  o 3x: blow bubbles on a sustained pitch softest to loudest to softest (messa di voce)    o 1-2x: Happy birthday bubbles (keep connected)  These exercises are great for:  * re-balance the intrinsic and extrinsic laryngeal musculature   * warm up / cool down - Part of the morning routine and before and after extended voice use.  *  Abdominal breathing and applying optimal breath flow to speech/singing.